# Patient Record
Sex: FEMALE | Race: WHITE | NOT HISPANIC OR LATINO | Employment: FULL TIME | URBAN - METROPOLITAN AREA
[De-identification: names, ages, dates, MRNs, and addresses within clinical notes are randomized per-mention and may not be internally consistent; named-entity substitution may affect disease eponyms.]

---

## 2018-03-14 PROBLEM — M54.9 ACUTE MIDLINE BACK PAIN: Status: ACTIVE | Noted: 2018-03-14

## 2018-08-21 ENCOUNTER — OFFICE VISIT (OUTPATIENT)
Dept: NEUROLOGY | Facility: CLINIC | Age: 33
End: 2018-08-21
Payer: COMMERCIAL

## 2018-08-21 VITALS
SYSTOLIC BLOOD PRESSURE: 118 MMHG | HEIGHT: 63 IN | DIASTOLIC BLOOD PRESSURE: 89 MMHG | BODY MASS INDEX: 32.38 KG/M2 | WEIGHT: 182.75 LBS | HEART RATE: 109 BPM

## 2018-08-21 DIAGNOSIS — R51.9 CHRONIC INTRACTABLE HEADACHE, UNSPECIFIED HEADACHE TYPE: ICD-10-CM

## 2018-08-21 DIAGNOSIS — G89.29 CHRONIC INTRACTABLE HEADACHE, UNSPECIFIED HEADACHE TYPE: ICD-10-CM

## 2018-08-21 DIAGNOSIS — G44.40 MEDICATION OVERUSE HEADACHE: ICD-10-CM

## 2018-08-21 DIAGNOSIS — G43.019 INTRACTABLE MIGRAINE WITHOUT AURA AND WITHOUT STATUS MIGRAINOSUS: ICD-10-CM

## 2018-08-21 PROCEDURE — 99204 OFFICE O/P NEW MOD 45 MIN: CPT | Mod: S$GLB,,, | Performed by: NURSE PRACTITIONER

## 2018-08-21 PROCEDURE — 99999 PR PBB SHADOW E&M-NEW PATIENT-LVL III: CPT | Mod: PBBFAC,,, | Performed by: NURSE PRACTITIONER

## 2018-08-21 PROCEDURE — 3008F BODY MASS INDEX DOCD: CPT | Mod: CPTII,S$GLB,, | Performed by: NURSE PRACTITIONER

## 2018-08-21 RX ORDER — SUMATRIPTAN SUCCINATE 100 MG/1
TABLET ORAL
COMMUNITY
Start: 2018-08-08 | End: 2018-08-21 | Stop reason: ALTCHOICE

## 2018-08-21 RX ORDER — ACETAMINOPHEN, CAFFEINE, DIHYDROCODEINE BITARTRATE 320.5; 30; 16 MG/1; MG/1; MG/1
1 CAPSULE ORAL 3 TIMES DAILY
COMMUNITY
End: 2018-09-18

## 2018-08-21 RX ORDER — TOPIRAMATE 200 MG/1
200 CAPSULE, EXTENDED RELEASE ORAL DAILY
COMMUNITY
End: 2018-09-18 | Stop reason: SDUPTHER

## 2018-08-21 RX ORDER — PREDNISONE 20 MG/1
TABLET ORAL
Qty: 30 TABLET | Refills: 0 | Status: SHIPPED | OUTPATIENT
Start: 2018-08-21 | End: 2018-09-18 | Stop reason: ALTCHOICE

## 2018-08-21 RX ORDER — ELETRIPTAN HYDROBROMIDE 40 MG/1
40 TABLET, FILM COATED ORAL
Qty: 12 TABLET | Refills: 5 | Status: SHIPPED | OUTPATIENT
Start: 2018-08-21 | End: 2020-12-03

## 2018-08-21 NOTE — PROGRESS NOTES
SUBJECTIVE:  Patient ID: La Nena Wright   MRN: [id number]  Referred By: Aaarefchio Self  Chief Complaint: Consult    History of Present Illness:   32 y.o. female with chronic migraines, anxiety, IBS, obesity, and fibromyalgia, who presents to clinic alone for evaluation of headaches.  Headaches and migraines initially began when she was 11 years old and have persisted since.  Mother has migraines as well.  States she has had a continuous migraine for the last week.  States she is seeing a neurologist at home, had an occipital nerve block done on August 3rd, was scheduled for facet blocks yesterday, however approval did not go through and injections were subsequently cancelled.  She is here for a second opinion?  Migraines typically are one-sided or the other, pain is described as a pounding or throbbing that is severe in nature.  Migraines occur at least 15 days per month each lasting all day if not multiple days.  Associated symptoms include nausea, vomiting, photo/phonophobia.  Migraines are worsened by movement. She has tried numerous preventive options without effect on her migraines.  Since she had the occipital nerve block, she feels the nerve block triggered this migraine she now has muscle atrophy in her right arm as well as burning in her arms.  Reports she has had MRI C-Spine as well as two EMG's performed by outside radiology.  Additionally, she is getting  in November, currently not on birth control, though she and her fiance are using condoms and not actively planning to get pregnant.      Treatments Tried and Response  Amitriptyline - caused AM drowsiness   Excedrin -   Trokendi - no   Nerve blocks - no   Relpax - helped in past  Sumatriptan PO - no   Sumatriptan SQ - no   Onzetra - no   Maxalt - no   Gabapentin - no   cymbalta - unsure   Prednisone - given today   Topiramate - no   Propranolol - no   Zanaflex - given today     Current Medications:    acetaminophen-caff-dihydrocod (TREZIX)  "320.5-30-16 mg Cap, Take 1 capsule by mouth 3 (three) times daily., Disp: , Rfl:     aspirin-acetaminophen-caffeine 250-250-65 mg (EXCEDRIN MIGRAINE) 250-250-65 mg per tablet, Take 1 tablet by mouth every 6 (six) hours as needed for Pain., Disp: , Rfl:     fenoprofen 400 mg Cap, Take 400 mg by mouth as needed., Disp: , Rfl:     hydrocodone-acetaminophen 7.5-325mg (NORCO) 7.5-325 mg per tablet, Take 1 tablet by mouth continuous prn. , Disp: , Rfl:     topiramate (TROKENDI XR) 200 mg Cp24, Take 400 mg by mouth., Disp: , Rfl:     eletriptan (RELPAX) 40 MG tablet, Take 1 tablet (40 mg total) by mouth as needed. may repeat in 2 hours if necessary.  Max 2 tabs/day. Max 3 days/week., Disp: 12 tablet, Rfl: 5    gabapentin enacarbil (HORIZANT) 600 mg TbSR, Take 600 mg by mouth every evening., Disp: , Rfl:     multivitamin (ONE DAILY MULTIVITAMIN) per tablet, Take 1 tablet by mouth once daily., Disp: , Rfl:     norethindrone-ethinyl estradiol-iron (MICROGESTIN FE1.5/30) 1.5 mg-30 mcg (21)/75 mg (7) tablet, Take 1 tablet by mouth once daily., Disp: , Rfl:     predniSONE (DELTASONE) 20 MG tablet, 3 tabs in AM x 5 days, then 2 tabs in AM x 5 days, then 1 tab in AM x 5 days. Take with food., Disp: 30 tablet, Rfl: 0    tiZANidine (ZANAFLEX) 2 MG tablet, Take 4 mg by mouth every evening., Disp: , Rfl:     vitamin D 1000 units Tab, Take 1,000 Units by mouth once daily., Disp: , Rfl:     Review of Systems - as per HPI, otherwise a balanced 10 systems review is negative.    OBJECTIVE:  Vitals:  /89 (BP Location: Left arm, Patient Position: Sitting, BP Method: X-Large (Automatic))   Pulse 109   Ht 5' 3" (1.6 m)   Wt 82.9 kg (182 lb 12.2 oz)   BMI 32.37 kg/m²     Physical Exam   Constitutional: She appears well-developed and well-nourished. She is well groomed. NAD  HENT:    Head: Normocephalic and atraumatic, oral and nasal mucosa intact.  Frontalis was NTTP, temporalis was NTTP    Eyes: Conjunctivae and EOM are " normal. Pupils are equal, round, and reactive to light   Neck: Neck supple. Occiput and trapezius NTTP    Musculoskeletal: Normal range of motion. No joint stiffness. No vertebral point tenderness.  Skin: Skin is warm and dry.  Psychiatric: Normal mood and affect.     Neuro exam:    Mental status:  The patient is alert and oriented to person, place and time.  Language is intact and fluent  Remote and recent memory are intact  Normal attention and concentration  Mood is stable    Cranial Nerves:  Fundoscopic examination does not reveal any occult papilledema.    Pupils are equal and reactive to light.    Extraocular movements are intact and without nystagmus.    Visual fields are full to confrontation testing.   Facial movement is symmetric.  Facial sensation is intact.    Hearing is intact to finger rub   Uvula in midline. Tongue in midline without fasciculation.   FROM of neck in all (6) directions.  Shoulder shrug symmetrical.    Coordination:     Finger to nose - normal and symmetric bilaterally   Heel to shin test - normal and symmetric bilaterally     Motor:  Normal muscle bulk and symmetry. No fasciculations were noted.   Tremor not apparent   Pronator drift not apparent.    strength was strong and symmetric   Finger extension strength was strong and symmetric   RUE:appropriate against gravity and medium force as tested 5/5  LUE: appropriate against gravity and medium force as tested 5/5  RLE:appropriate against gravity and medium force as tested 5/5              LLE: appropriate against gravity and medium force as tested 5/5    Reflexes:  Right Brachioradialis 2+  Left Brachioradialis 2+  Right Biceps 2+  Left Biceps 2+  Right Patellar2+  Left Patellar 2+                          Gutierrez was positive on right, negative on left     Sensory:  RUE  deficit noted - subjective report of decreased sensation to light touch   LUE intact light touch    RLE intact light touch  LLE intact light touch    Normal  "Gait    ASSESSMENT:  1. Chronic migraine    2. Intractable migraine without aura and without status migrainosus    3. Chronic intractable headache, unspecified headache type    4. Medication overuse headache      Medical Decision Making:  BOTOX  The patient has chronic migraines (G43.719) and suffers from headaches more than 15 days a month lasting more than 4 hours a day with no relief of symptoms despite trying multiple medications including amitriptyline, zanaflex, propranolol, prednisone, topiramate, nerve blocks, relpax, sumatriptan SQ, sumatriptan PO, cymbalta, gabapentin, maxalt, onzetra, relpax, excedrin. Botox treatment was approved for chronic migraines in October 2010. The patient will be an ideal candidate for Botox. We are planning for 3 treatments 3 months apart and aiming for at least 50% improvement in the symptoms. If we see no improvement after 3 treatments, we will discontinue the injections.    PLAN:  - Seek approval for Botox injections for chronic migraine   - Record request from Dr. Anthony   - Given 15 day prednisone taper - 60 mg x 5 days, 40 mg x 5 days, 20 mg x 5 days, take in the morning with food   - Continue gabapentin and trokendi for migraine prevention   - Restart Cymbalta to help with both anxiety and migraine prevention   - For migraine abortive - given Relpax 40 mg tabs   - Consider starting Aimovig in future    - Lengthy discussion regarding appropriate use of "as needed" medications to avoid medication overuse headache   - Start tracking headaches via Migraine Buddy claudy on phone   - RTC in 1 month to start Botox injections      Orders Placed This Encounter    predniSONE (DELTASONE) 20 MG tablet    eletriptan (RELPAX) 40 MG tablet     I have discussed realistic goals of care with patient at length as well as medication options, and need for lifestyle adjustment. I have explained that treatment will take time. We have agreed that the goal will be to reduce " frequency/intensity/quantity of HA, not to be completely HA free. I have explained my non narcotic policy regarding headache treatment.    Patient to track frequency of headaches.  Patient agreeable to work on lifestyle adjustments.    Discussed potential for teratogenicity with treatment, patient understands if her family planning status should change she will contact office immediately and we will safely adjust medications as needed.  Current using Condoms.     I spent 50 minutes face-to-face with the patient with >50% of the time spent with counseling and education regarding:  - results of data, diagnosis, and recommendations stated above  - risks, benefits, and potential side effects of Botox, prednisone discussed   - alternative treatment options including aimovig offered   - importance of healthy diet, regular exercise and sleep hygiene in the treatment of headaches      Questions and concerns were sought and answered to the patient's stated verbal satisfaction.  The patient verbalizes understanding and agreement with the above stated treatment plan.     CC: MD Sari Kang, FNP-C  [facility]  [phone]    Dr. Cotto was available during today's encounter.

## 2018-08-22 ENCOUNTER — TELEPHONE (OUTPATIENT)
Dept: NEUROLOGY | Facility: CLINIC | Age: 33
End: 2018-08-22

## 2018-08-23 ENCOUNTER — TELEPHONE (OUTPATIENT)
Dept: NEUROLOGY | Facility: CLINIC | Age: 33
End: 2018-08-23

## 2018-08-23 ENCOUNTER — PATIENT MESSAGE (OUTPATIENT)
Dept: NEUROLOGY | Facility: CLINIC | Age: 33
End: 2018-08-23

## 2018-08-23 NOTE — TELEPHONE ENCOUNTER
Pa questions for eletriptan were completed. Tablet quantity changed to 9, and signed by RODERICK Arauz, faxed to [facility] [place]

## 2018-08-23 NOTE — TELEPHONE ENCOUNTER
I left message w/my direct number for pt to call back. Need to complete questions for pA for eletriptan authorization

## 2018-08-24 ENCOUNTER — TELEPHONE (OUTPATIENT)
Dept: NEUROLOGY | Facility: CLINIC | Age: 33
End: 2018-08-24

## 2018-08-30 ENCOUNTER — TELEPHONE (OUTPATIENT)
Dept: NEUROLOGY | Facility: CLINIC | Age: 33
End: 2018-08-30

## 2018-08-30 NOTE — TELEPHONE ENCOUNTER
Records placed on your desk, I'm not sure if you saw them or not. I was getting ready to send them to be scanned.

## 2018-08-30 NOTE — TELEPHONE ENCOUNTER
----- Message from Temo Lowry sent at 8/30/2018  3:49 PM CDT -----  Needs Advice    Reason for call: Pt is calling to confirm if Sari was able to review records for muscle atrophy in right forearm, so she can be referred to a neurologist     Communication Preference: [facility]  Additional Information:

## 2018-09-04 ENCOUNTER — PATIENT MESSAGE (OUTPATIENT)
Dept: NEUROLOGY | Facility: CLINIC | Age: 33
End: 2018-09-04

## 2018-09-04 DIAGNOSIS — M50.10 CERVICAL DISC DISORDER WITH RADICULOPATHY OF CERVICAL REGION: ICD-10-CM

## 2018-09-04 DIAGNOSIS — G89.29 CHRONIC NECK PAIN: Primary | ICD-10-CM

## 2018-09-04 DIAGNOSIS — M54.2 CHRONIC NECK PAIN: Primary | ICD-10-CM

## 2018-09-18 ENCOUNTER — OFFICE VISIT (OUTPATIENT)
Dept: NEUROLOGY | Facility: CLINIC | Age: 33
End: 2018-09-18
Payer: COMMERCIAL

## 2018-09-18 ENCOUNTER — TELEPHONE (OUTPATIENT)
Dept: PHARMACY | Facility: CLINIC | Age: 33
End: 2018-09-18

## 2018-09-18 VITALS
WEIGHT: 180.75 LBS | DIASTOLIC BLOOD PRESSURE: 94 MMHG | HEART RATE: 95 BPM | BODY MASS INDEX: 32.03 KG/M2 | HEIGHT: 63 IN | SYSTOLIC BLOOD PRESSURE: 128 MMHG

## 2018-09-18 DIAGNOSIS — G43.009 MIGRAINE WITHOUT AURA AND WITHOUT STATUS MIGRAINOSUS, NOT INTRACTABLE: ICD-10-CM

## 2018-09-18 DIAGNOSIS — M79.18 MYOFASCIAL PAIN: ICD-10-CM

## 2018-09-18 DIAGNOSIS — G47.9 TROUBLE IN SLEEPING: ICD-10-CM

## 2018-09-18 PROCEDURE — 99214 OFFICE O/P EST MOD 30 MIN: CPT | Mod: S$GLB,,, | Performed by: NURSE PRACTITIONER

## 2018-09-18 PROCEDURE — 3008F BODY MASS INDEX DOCD: CPT | Mod: CPTII,S$GLB,, | Performed by: NURSE PRACTITIONER

## 2018-09-18 PROCEDURE — 99999 PR PBB SHADOW E&M-EST. PATIENT-LVL III: CPT | Mod: PBBFAC,,, | Performed by: NURSE PRACTITIONER

## 2018-09-18 RX ORDER — HYDROCODONE BITARTRATE AND ACETAMINOPHEN 5; 325 MG/1; MG/1
1 TABLET ORAL EVERY 6 HOURS PRN
Status: ON HOLD | COMMUNITY
End: 2020-07-22 | Stop reason: HOSPADM

## 2018-09-18 RX ORDER — TOPIRAMATE 200 MG/1
200 CAPSULE, EXTENDED RELEASE ORAL DAILY
Qty: 30 CAPSULE | Refills: 11 | Status: SHIPPED | OUTPATIENT
Start: 2018-09-18 | End: 2018-12-11

## 2018-09-18 RX ORDER — TIZANIDINE 2 MG/1
TABLET ORAL
Qty: 60 TABLET | Refills: 5 | Status: SHIPPED | OUTPATIENT
Start: 2018-09-18 | End: 2020-12-03

## 2018-09-18 RX ORDER — LACOSAMIDE 150 MG/1
75 TABLET ORAL NIGHTLY PRN
COMMUNITY
End: 2018-12-11

## 2018-09-18 NOTE — PROGRESS NOTES
"Established Patient   SUBJECTIVE:  Patient ID: La Nena Wright   Chief Complaint: Follow-up    History of Present Illness:  La Nena Wright is a 33 y.o. female who presents to clinic with her mother for follow-up of headaches.  History is provided by both the patient and her mother.     Recommendations made at last Office Visit on 8/201/2018:  - Seek approval for Botox injections for chronic migraine   - Record request from Dr. Anthony   - Given 15 day prednisone taper - 60 mg x 5 days, 40 mg x 5 days, 20 mg x 5 days, take in the morning with food   - Continue gabapentin and trokendi for migraine prevention   - Restart Cymbalta to help with both anxiety and migraine prevention   - For migraine abortive - given Relpax 40 mg tabs   - Consider starting Aimovig in future    - Lengthy discussion regarding appropriate use of "as needed" medications to avoid medication overuse headache   - Start tracking headaches via Migraine Buddy claudy on phone   - RTC in 1 month to start Botox injections      09/18/2018 - Interval History:  Headaches have continued to occur daily with full blown migraines occurring at least 2-3 days per week.  Since last visit, she has stopped taking cymbalta and gabapentin.  She has continued taking trokendi nightly with vimpat prn nightly.  She considered getting Botox injections for chronic migraine, however has determined she does not want to go through the the Botox injections and would like to discuss alternative treatment options.  She denies any change in the quality or nature of her headaches.      History of Present Illness:   32 y.o. female with chronic migraines, anxiety, IBS, obesity, and fibromyalgia, who presents to clinic alone for evaluation of headaches.  Headaches and migraines initially began when she was 11 years old and have persisted since.  Mother has migraines as well.  States she has had a continuous migraine for the last week.  States she is seeing a neurologist at home, " had an occipital nerve block done on August 3rd, was scheduled for facet blocks yesterday, however approval did not go through and injections were subsequently cancelled.  She is here for a second opinion?  Migraines typically are one-sided or the other, pain is described as a pounding or throbbing that is severe in nature.  Migraines occur at least 15 days per month each lasting all day if not multiple days.  Associated symptoms include nausea, vomiting, photo/phonophobia.  Migraines are worsened by movement. She has tried numerous preventive options without effect on her migraines.  Since she had the occipital nerve block, she feels the nerve block triggered this migraine she now has muscle atrophy in her right arm as well as burning in her arms.  Reports she has had MRI C-Spine as well as two EMG's performed by outside radiology.  Additionally, she is getting  in November, currently not on birth control, though she and her fiance are using condoms and not actively planning to get pregnant.       Treatments Tried and Response  Amitriptyline - caused AM drowsiness   Excedrin -   Trokendi - no   Nerve blocks - no   Relpax - helped in past  Sumatriptan PO - no   Sumatriptan SQ - no   Onzetra - no   Maxalt - no   Gabapentin - no   cymbalta - unsure   Prednisone - given today   Topiramate - no   Propranolol - no   Zanaflex - retried today   Aimovig - given today     Current Medications:    aspirin-acetaminophen-caffeine 250-250-65 mg (EXCEDRIN MIGRAINE) 250-250-65 mg per tablet, Take 1 tablet by mouth every 6 (six) hours as needed for Pain., Disp: , Rfl:     eletriptan (RELPAX) 40 MG tablet, Take 1 tablet (40 mg total) by mouth as needed. may repeat in 2 hours if necessary.  Max 2 tabs/day. Max 3 days/week., Disp: 12 tablet, Rfl: 5    HYDROcodone-acetaminophen (NORCO) 5-325 mg per tablet, Take 1 tablet by mouth every 6 (six) hours as needed for Pain (1/2 tablet as needed)., Disp: , Rfl:     multivitamin (ONE  "DAILY MULTIVITAMIN) per tablet, Take 1 tablet by mouth once daily., Disp: , Rfl:     norethindrone-ethinyl estradiol-iron (MICROGESTIN FE1.5/30) 1.5 mg-30 mcg (21)/75 mg (7) tablet, Take 1 tablet by mouth once daily., Disp: , Rfl:     tiZANidine (ZANAFLEX) 2 MG tablet, Take 1-2 tabs around bedtime, no alcohol, no driving with medication., Disp: 60 tablet, Rfl: 5    topiramate (TROKENDI XR) 200 mg Cp24, Take 200 mg by mouth once daily., Disp: 30 capsule, Rfl: 11    vitamin D 1000 units Tab, Take 1,000 Units by mouth once daily., Disp: , Rfl:     erenumab-aooe (AIMOVIG AUTOINJECTOR) 70 mg/mL AtIn, Inject 2 mLs (140 mg total) into the skin every 28 days., Disp: 2 Syringe, Rfl: 11    lacosamide (VIMPAT) 150 mg Tab, Take 75 mg by mouth nightly as needed., Disp: , Rfl:     Review of Systems - as per HPI, otherwise a balanced 10 systems review is negative.    OBJECTIVE:  Vitals:  BP (!) 128/94 (BP Location: Right arm, Patient Position: Sitting, BP Method: Large (Automatic))   Pulse 95   Ht 5' 3" (1.6 m)   Wt 82 kg (180 lb 12.4 oz)   BMI 32.02 kg/m²      Physical Exam:  Constitutional: She appears well-developed and well-nourished. She is well groomed. NAD. Obese.    HENT:    Head: Normocephalic and atraumatic   Musculoskeletal: Normal range of motion. No joint stiffness.   Skin: Skin is warm and dry.  Psychiatric: Normal mood and affect.  Neuro: Patient is alert and oriented to person, place and time.  Speech is clear and fluent. Recent and remote memory intact.  Moves all 4 extremities against gravity. Gait and station normal.      Review of Data:   Patient brought MR C-Spine performed 9/7/2018 to clinic, imaging was reviewed with patient    Note: I have independently reviewed any/all imaging/labs/tests and agree with the report (s) as documented.  Any discrepancies will be as noted/demarcated by free text.  PATY IZAGUIRRE 9/18/2018    ASSESSMENT:  1. Chronic migraine    2. Migraine without aura and without status " migrainosus, not intractable    3. Myofascial pain    4. Trouble in sleeping      PLAN:  - Start Aimovig 140 mg SQ monthly injectable   - Continue Trokendi 200 mg nightly   - Restart Zanaflex 2-4 mg nightly   - Has relpax available to take as needed for migraine abortive   - Okay to continue taking Vimpat 75 mg as needed to help with sleep   - Continue tracking headaches   - Discussed goals of therapy are to decrease the frequency, intensity, and duration of headaches  - recommend she avoid use of Norco for acute pain, if absolutely needed, she is to avoid use of norco on more than 2-3 days per week to avoid rebound or MOH   - Follow up in 3 months or sooner if needed     Orders Placed This Encounter    tiZANidine (ZANAFLEX) 2 MG tablet    erenumab-aooe (AIMOVIG AUTOINJECTOR) 70 mg/mL AtIn    topiramate (TROKENDI XR) 200 mg Cp24     Discussed potential for teratogenicity with treatment, patient understands if her family planning status should change she will contact office immediately and we will safely adjust medications as needed.  Currently on birth control, uses back-up method as well.      I spent 25 minutes face-to-face with the patient with >50% of the time spent with counseling and education regarding:  - results of data, diagnosis, and recommendations stated above  - risks, benefits, and potential side effects of aimovig discussed   - alternative treatment options including gabapentin, effexor offered   - importance of healthy diet, regular exercise and sleep hygiene in the treatment of headaches     Questions and concerns were sought and answered to the patient's stated verbal satisfaction.  The patient verbalizes understanding and agreement with the above stated treatment plan.     CC: MD Sari Kang, FNP-C  [facility]   [phone]    Dr. Cotto was available during today's encounter.

## 2018-09-21 NOTE — TELEPHONE ENCOUNTER
DOCUMENTATION ONLY:  Prior Authorization for Aimovig approved from 09/21/18 to 12/21/18    Case Id: [id number]    Co-pay: 0    Patient Assistance IS NOT required. Forwarded to the clinical pharmacist for consult and shipment.  KITTY

## 2018-09-26 ENCOUNTER — TELEPHONE (OUTPATIENT)
Dept: PHARMACY | Facility: CLINIC | Age: 33
End: 2018-09-26

## 2018-09-26 NOTE — TELEPHONE ENCOUNTER
Initial Aimovig consult completed on 18. Aimovig 140mg will be shipped on 18 to arrive at patient's home on 18 via [employer]. $0.00 copay. Patient intends to start Aimovig once received (18). Address confirmed. Confirmed 2 patient identifiers - name and . Therapy Appropriate.    --Injection experience: yes    Counseled patient on administration directions:  - Inject 140 mg (2 auto-injectors) into the skin every 30 days.   - Informed to store in refrigerator prior to use (do not freeze, do not shake, keep in original box until use).  - Take out of the refrigerator 30-60 minutes prior to injection.  - Wash hands before and after injection.  - Monthly RX will come with gauze, bandaids, and alcohol swabs.  - Patient may inject in either the tops of the thighs, abdomen- but at least 2 inches away from belly button, or the outer part of her upper arm (with assistance). Informed patient to use 2 different injection sites since she will be doing 2 injections for full 140 mg dose.   - Patient is to wipe down the injection site with the alcohol pad, wait to dry.    - Patient should rotate injection sites.   - Patient will use sharps container; once full, per [place] law, she/ he may lock the sharps container and place in trash. Pharmacy will replace the sharps at no additional charge.    Patient declined full review of administration technique. Stated that she saw demonstration in office and is comfortable. She will also watch the online training video.     Patient was counseled on possible side effects:  - Injection site reaction: redness, soreness, itching, bruising, which should resolve within 3-5 days.  - constipation, possible dizziness.    Advised to keep a calendar to stay compliant.     Consultation included: indication; goals of treatment; administration; storage and handling; side effects; how to handle side effects; the importance of compliance; the importance of keeping all follow  up appointments.  Patient understands to report any medication changes to [facility] and provider. All questions answered and addressed to patients satisfaction. I will f/u with patient in 7-10 days from start, [facility] to contact patient in 3 weeks for refills.       Moise Elizondo, PharmD  Clinical Pharmacist  [facility]  P: [phone]      In[place]akyree sent on 9/26/18 at 10:04 am

## 2018-10-01 ENCOUNTER — TELEPHONE (OUTPATIENT)
Dept: NEUROLOGY | Facility: CLINIC | Age: 33
End: 2018-10-01

## 2018-10-01 ENCOUNTER — PATIENT MESSAGE (OUTPATIENT)
Dept: NEUROLOGY | Facility: CLINIC | Age: 33
End: 2018-10-01

## 2018-10-01 RX ORDER — TOPIRAMATE 100 MG/1
100 TABLET, FILM COATED ORAL 2 TIMES DAILY
Qty: 60 TABLET | Refills: 2 | Status: SHIPPED | OUTPATIENT
Start: 2018-10-01 | End: 2018-12-11

## 2018-10-02 ENCOUNTER — PATIENT MESSAGE (OUTPATIENT)
Dept: NEUROLOGY | Facility: CLINIC | Age: 33
End: 2018-10-02

## 2018-10-02 ENCOUNTER — TELEPHONE (OUTPATIENT)
Dept: NEUROLOGY | Facility: CLINIC | Age: 33
End: 2018-10-02

## 2018-10-19 ENCOUNTER — TELEPHONE (OUTPATIENT)
Dept: PHARMACY | Facility: CLINIC | Age: 33
End: 2018-10-19

## 2018-11-02 ENCOUNTER — OFFICE VISIT (OUTPATIENT)
Dept: URGENT CARE | Facility: CLINIC | Age: 33
End: 2018-11-02
Payer: COMMERCIAL

## 2018-11-02 VITALS
HEART RATE: 77 BPM | SYSTOLIC BLOOD PRESSURE: 136 MMHG | TEMPERATURE: 100 F | HEIGHT: 63 IN | BODY MASS INDEX: 31.89 KG/M2 | DIASTOLIC BLOOD PRESSURE: 91 MMHG | WEIGHT: 180 LBS | RESPIRATION RATE: 18 BRPM | OXYGEN SATURATION: 98 %

## 2018-11-02 DIAGNOSIS — J01.40 ACUTE NON-RECURRENT PANSINUSITIS: ICD-10-CM

## 2018-11-02 DIAGNOSIS — R68.89 FLU-LIKE SYMPTOMS: Primary | ICD-10-CM

## 2018-11-02 DIAGNOSIS — J02.9 ACUTE PHARYNGITIS, UNSPECIFIED ETIOLOGY: ICD-10-CM

## 2018-11-02 LAB
CTP QC/QA: YES
FLUAV AG NPH QL: NEGATIVE
FLUBV AG NPH QL: NEGATIVE

## 2018-11-02 PROCEDURE — 99214 OFFICE O/P EST MOD 30 MIN: CPT | Mod: 25,S$GLB,, | Performed by: FAMILY MEDICINE

## 2018-11-02 PROCEDURE — 87804 INFLUENZA ASSAY W/OPTIC: CPT | Mod: QW,S$GLB,, | Performed by: FAMILY MEDICINE

## 2018-11-02 PROCEDURE — 96372 THER/PROPH/DIAG INJ SC/IM: CPT | Mod: S$GLB,,, | Performed by: FAMILY MEDICINE

## 2018-11-02 PROCEDURE — 3008F BODY MASS INDEX DOCD: CPT | Mod: CPTII,S$GLB,, | Performed by: FAMILY MEDICINE

## 2018-11-02 RX ORDER — PROMETHAZINE HYDROCHLORIDE 25 MG/1
25 TABLET ORAL EVERY 6 HOURS PRN
Qty: 20 TABLET | Refills: 0 | Status: SHIPPED | OUTPATIENT
Start: 2018-11-02 | End: 2019-02-18

## 2018-11-02 RX ORDER — DEXAMETHASONE SODIUM PHOSPHATE 100 MG/10ML
5 INJECTION INTRAMUSCULAR; INTRAVENOUS
Status: COMPLETED | OUTPATIENT
Start: 2018-11-02 | End: 2018-11-02

## 2018-11-02 RX ORDER — CEFDINIR 300 MG/1
300 CAPSULE ORAL 2 TIMES DAILY
Qty: 20 CAPSULE | Refills: 0 | Status: SHIPPED | OUTPATIENT
Start: 2018-11-02 | End: 2018-11-12

## 2018-11-02 RX ADMIN — DEXAMETHASONE SODIUM PHOSPHATE 5 MG: 100 INJECTION INTRAMUSCULAR; INTRAVENOUS at 05:11

## 2018-11-02 NOTE — PROGRESS NOTES
"Subjective:       Patient ID: La Nena Wright is a 33 y.o. female.    Vitals:  height is 5' 3" (1.6 m) and weight is 81.6 kg (180 lb). Her temperature is 99.6 °F (37.6 °C). Her blood pressure is 136/91 (abnormal) and her pulse is 77. Her respiration is 18 and oxygen saturation is 98%.     Chief Complaint: Sinus Problem    Sinus Problem   This is a new problem. The current episode started in the past 7 days. The problem is unchanged. There has been no fever. The pain is moderate. Associated symptoms include chills, congestion, coughing and a sore throat. Pertinent negatives include no ear pain, headaches, hoarse voice or shortness of breath. Past treatments include oral decongestants and acetaminophen. The treatment provided no relief.     Review of Systems   Constitution: Positive for chills and malaise/fatigue. Negative for fever.   HENT: Positive for congestion and sore throat. Negative for ear pain and hoarse voice.    Eyes: Negative for discharge and redness.   Cardiovascular: Negative for chest pain, dyspnea on exertion and leg swelling.   Respiratory: Positive for cough. Negative for shortness of breath, sputum production and wheezing.    Musculoskeletal: Positive for myalgias.   Gastrointestinal: Positive for constipation and nausea. Negative for abdominal pain.   Neurological: Negative for headaches.       Objective:      Physical Exam   Constitutional: She is oriented to person, place, and time. She appears well-developed and well-nourished. She is cooperative.  Non-toxic appearance. She does not appear ill. No distress.   HENT:   Head: Normocephalic and atraumatic.   Right Ear: Hearing, tympanic membrane, external ear and ear canal normal.   Left Ear: Hearing, tympanic membrane, external ear and ear canal normal.   Nose: No mucosal edema, rhinorrhea or nasal deformity. No epistaxis. Right sinus exhibits maxillary sinus tenderness and frontal sinus tenderness. Left sinus exhibits maxillary sinus " tenderness and frontal sinus tenderness.   Mouth/Throat: Uvula is midline and mucous membranes are normal. No trismus in the jaw. Normal dentition. No uvula swelling. Posterior oropharyngeal edema and posterior oropharyngeal erythema present.   Eyes: Conjunctivae and lids are normal. No scleral icterus.   Neck: Trachea normal, full passive range of motion without pain and phonation normal. Neck supple.   Cardiovascular: Normal rate, regular rhythm, normal heart sounds, intact distal pulses and normal pulses.   Pulmonary/Chest: Effort normal and breath sounds normal. No respiratory distress.   Abdominal: Soft. Normal appearance and bowel sounds are normal. She exhibits no distension. There is no tenderness.   Musculoskeletal: Normal range of motion. She exhibits no edema or deformity.   Neurological: She is alert and oriented to person, place, and time. She exhibits normal muscle tone. Coordination normal.   Skin: Skin is warm, dry and intact. She is not diaphoretic. No pallor.   Psychiatric: She has a normal mood and affect. Her speech is normal and behavior is normal. Judgment and thought content normal. Cognition and memory are normal.   Nursing note and vitals reviewed.      Results for orders placed or performed in visit on 11/02/18   POCT Influenza A/B   Result Value Ref Range    Rapid Influenza A Ag Negative Negative    Rapid Influenza B Ag Negative Negative     Acceptable Yes        Assessment:       1. Flu-like symptoms    2. Acute pharyngitis, unspecified etiology    3. Acute non-recurrent pansinusitis        Plan:         Flu-like symptoms  -     POCT Influenza A/B    Acute pharyngitis, unspecified etiology    Acute non-recurrent pansinusitis  -     cefdinir (OMNICEF) 300 MG capsule; Take 1 capsule (300 mg total) by mouth 2 (two) times daily. for 10 days  Dispense: 20 capsule; Refill: 0  -     dexchlorpheniramin-pseudoephed (RESCON) 2-60 mg Tab; Take 1 tablet by mouth 2 (two) times daily as  needed.  Dispense: 20 tablet; Refill: 0  -     dexamethasone injection 5 mg    Please drink plenty of fluids.  Please get plenty of rest.  Please return here or go to the Emergency Department for any concerns or worsening of condition.  If you were given wait & see antibiotics, please wait 3-5 days before taking them, and only take them if your symptoms have worsened or not improved.  If you do begin taking the antibiotics, please take them to completion.  If you were prescribed antibiotics, please take them to completion.  If you were prescribed a narcotic medication, do not drive or operate heavy equipment or machinery while taking these medications.    You were given a decongestant (RESCON or POLY VENT Dm).  If your insurance does not cover it or you cannot afford it, it is ok to use the over the counter products listed below.  If you do not have Hypertension or any history of palpitations, it is ok to take over the counter Sudafed or Mucinex D or Allegra-D or Claritin-D or Zyrtec-D.  If you do take one of the above, it is ok to combine that with plain over the counter Mucinex or Allegra or Claritin or Zyrtec.  If for example you are taking Zyrtec -D, you can combine that with Mucinex, but not Mucinex-D.  If you are taking Mucinex-D, you can combine that with plain Allegra or Claritin or Zyrtec.   If you do have Hypertension or palpitations, it is safe to take Coricidin HBP for relief of sinus symptoms.    We recommend you take over the counter Flonase (Fluticasone) or another nasally inhaled steroid unless you are already taking one.  Nasal irrigation with a saline spray or Netti Pot like device per their directions is also recommended.  If not allergic, please take over the counter Tylenol (Acetaminophen) and/or Motrin (Ibuprofen) as directed for control of pain and/or fever.    Robitussin DM 2 teas every 4 hours as needed for cough.  If you  smoke, please stop smoking.    You were given an injection of  steroid.  This will relieve swelling and inflammation and improve respiratory symptoms.  It can raise your blood sugar if you are diabetic.    Please follow up with your primary care doctor or specialist as needed.  Branden Thao MD  [phone]

## 2018-11-02 NOTE — PATIENT INSTRUCTIONS
Please drink plenty of fluids.  Please get plenty of rest.  Please return here or go to the Emergency Department for any concerns or worsening of condition.  If you were given wait & see antibiotics, please wait 3-5 days before taking them, and only take them if your symptoms have worsened or not improved.  If you do begin taking the antibiotics, please take them to completion.  If you were prescribed antibiotics, please take them to completion.  If you were prescribed a narcotic medication, do not drive or operate heavy equipment or machinery while taking these medications.    You were given a decongestant (RESCON or POLY VENT Dm).  If your insurance does not cover it or you cannot afford it, it is ok to use the over the counter products listed below.  If you do not have Hypertension or any history of palpitations, it is ok to take over the counter Sudafed or Mucinex D or Allegra-D or Claritin-D or Zyrtec-D.  If you do take one of the above, it is ok to combine that with plain over the counter Mucinex or Allegra or Claritin or Zyrtec.  If for example you are taking Zyrtec -D, you can combine that with Mucinex, but not Mucinex-D.  If you are taking Mucinex-D, you can combine that with plain Allegra or Claritin or Zyrtec.   If you do have Hypertension or palpitations, it is safe to take Coricidin HBP for relief of sinus symptoms.    We recommend you take over the counter Flonase (Fluticasone) or another nasally inhaled steroid unless you are already taking one.  Nasal irrigation with a saline spray or Netti Pot like device per their directions is also recommended.  If not allergic, please take over the counter Tylenol (Acetaminophen) and/or Motrin (Ibuprofen) as directed for control of pain and/or fever.    Robitussin DM 2 teas every 4 hours as needed for cough.  If you  smoke, please stop smoking.    You were given an injection of steroid.  This will relieve swelling and inflammation and improve respiratory  symptoms.  It can raise your blood sugar if you are diabetic.    Please follow up with your primary care doctor or specialist as needed.  Branden Thao MD  [phone]        Acute Bacterial Rhinosinusitis (ABRS)  Acute bacterial rhinosinusitis (ABRS) is an infection of your nasal cavity and sinuses. Its caused by bacteria. Acute means that youve had symptoms for less than 12 weeks.  Understanding your sinuses  The nasal cavity is the large air-filled space behind your nose. The sinuses are a group of spaces formed by the bones of your face. They connect with your nasal cavity. ABRS causes the tissue lining these spaces to become inflamed. Mucus may not drain normally. This leads to facial pain and other symptoms.  What causes ABRS?  ABRS most often follows an upper respiratory infection caused by a virus. Bacteria then infect the lining of your nasal cavity and sinuses. But you can also get ABRS if you have:  · Nasal allergies  · Long-term nasal swelling and congestion not caused by allergies  · Blockage in the nose  Symptoms of ABRS  The symptoms of ABRS may be different for each person, and can include:  · Nasal congestion  · Runny nose  · Fluid draining from the nose down the throat (postnasal drip)  · Headache  · Cough  · Pain in the sinuses  · Thick, colored fluid from the nose (mucus)  · Fever  Diagnosing ABRS  ABRS may be diagnosed if youve had an upper respiratory infection like a cold and cough for longer than 10 to 14 days. Your health care provider will ask about your symptoms and your medical history. The provider will check your vital signs, including your temperature. Youll have a physical exam. The health care provider will check your ears, nose, and throat. You likely wont need any tests. If ABRS comes back, you may have a culture or other tests.  Treatment for ABRS  Treatment may include:  · Antibiotic medicine. This is for symptoms that last for at least 10 to 14 days.  · Nasal  corticosteroid medicine. Drops or spray used in the nose can lessen swelling and congestion.  · Over-the-counter pain medicine. This is to lessen sinus pain and pressure.  · Nasal decongestant medicine. Spray or drops may help to lessen congestion. Do not use them for more than a few days.  · Salt wash (saline irrigation). This can help to loosen mucus.  Possible complications of ABRS  ABRS may come back or become long-term (chronic).  In rare cases, ABRS may cause complications such as:   · Inflamed tissue around the brain and spinal cord (meningitis)  · Inflamed tissue around the eyes (orbital cellulitis)  · Inflamed bones around the sinuses (osteitis)  These problems may need to be treated in a hospital with intravenous (IV) antibiotic medicine or surgery.  When to call the health care provider  Call your health care provider if you have any of the following:  · Symptoms that dont get better, or get worse  · Symptoms that dont get better after 3 to 5 days on antibiotics  · Trouble seeing  · Swelling around your eyes  · Confusion or trouble staying awake   Date Last Reviewed: 3/3/2015  © 5257-3040 [employer]. [address] [place] [address]. All rights reserved. This information is not intended as a substitute for professional medical care. Always follow your healthcare professional's instructions.      Pharyngitis: Strep (Presumed)    You have pharyngitis (sore throat). The cause is thought to be the streptococcus, or strep, bacterium. Strep throat infection can cause throat pain that is worse when swallowing, aching all over, headache, and fever. The infection may be spread by coughing, kissing, or touching others after touching your mouth or nose. Antibiotic medications are given to treat the infection.  Home care  · Rest at home. Drink plenty of fluids to avoid dehydration.  · No work or school for the first 2 days of taking the antibiotics. After this time, you will not be  contagious. You can then return to work or school if you are feeling better.   · The antibiotic medication must be taken for the full 10 days, even if you feel better. This is very important to ensure the infection is treated. It is also important to prevent drug-resistant organisms from developing. If you were given an antibiotic shot, no more antibiotics are needed.  · You may use acetaminophen or ibuprofen to control pain or fever, unless another medicine was prescribed for this. If you have chronic liver or kidney disease or ever had a stomach ulcer or GI bleeding, talk with your doctor before using these medicines.  · Throat lozenges or a throat-numbing sprays can help reduce throat pain. Gargling with warm salt water can also help. Dissolve 1/2 teaspoon of salt in 1 8 ounce glass of warm water.   · Avoid salty or spicy foods, which can irritate the throat.  Follow-up care  Follow up with your healthcare provider or our staff if you are not improving over the next week.  When to seek medical advice  Call your healthcare provider right away if any of these occur:  · Fever as directed by your doctor.   · New or worsening ear pain, sinus pain, or headache  · Painful lumps in the back of neck  · Stiff neck  · Lymph nodes are getting larger  · Inability to swallow liquids, excessive drooling, or inability to open mouth wide due to throat pain  · Signs of dehydration (very dark urine or no urine, sunken eyes, dizziness)  · Trouble breathing or noisy breathing  · Muffled voice  · New rash  Date Last Reviewed: 4/13/2015  © 6621-3504 [employer]. [address] [place] [address], [place] [address]. All rights reserved. This information is not intended as a substitute for professional medical care. Always follow your healthcare professional's instructions.

## 2018-11-13 ENCOUNTER — PATIENT MESSAGE (OUTPATIENT)
Dept: NEUROLOGY | Facility: CLINIC | Age: 33
End: 2018-11-13

## 2018-11-14 ENCOUNTER — TELEPHONE (OUTPATIENT)
Dept: PHARMACY | Facility: CLINIC | Age: 33
End: 2018-11-14

## 2018-11-14 ENCOUNTER — PATIENT MESSAGE (OUTPATIENT)
Dept: NEUROLOGY | Facility: CLINIC | Age: 33
End: 2018-11-14

## 2018-11-14 RX ORDER — TOPIRAMATE 50 MG/1
TABLET, FILM COATED ORAL
Qty: 60 TABLET | Refills: 0 | Status: SHIPPED | OUTPATIENT
Start: 2018-11-14 | End: 2018-12-11

## 2018-12-10 NOTE — PROGRESS NOTES
Established Patient   SUBJECTIVE:  Patient ID: La Nena Wright   Chief Complaint: Follow-up    History of Present Illness:  La Nena Wright is a 33 y.o. female who presents to clinic alone for follow-up of headaches.     Recommendations made at last Office Visit on 9/18/2018:  - Start Aimovig 140 mg SQ monthly injectable   - Continue Trokendi 200 mg nightly   - Restart Zanaflex 2-4 mg nightly   - Has relpax available to take as needed for migraine abortive   - Okay to continue taking Vimpat 75 mg as needed to help with sleep   - Continue tracking headaches   - Discussed goals of therapy are to decrease the frequency, intensity, and duration of headaches  - recommend she avoid use of Norco for acute pain, if absolutely needed, she is to avoid use of norco on more than 2-3 days per week to avoid rebound or MOH   - Follow up in 3 months or sooner if needed     12/11/2018 - Interval History:  Patient arrives to clinic 31 minutes late for appointment.  States headaches and migraines have been under great control, she has been tapering off topiramate and is currently taking topiramate 25 mg nightly, tonight is the last day on topiramate 25 mg.  She has continued using Aimovig 140 mg SQ monthly injections, which she attributes her migraine control to.  She denies presence of side effects with medication.  She would like to begin trying to get pregnant in early next year, she is currently still on oral contraception and plans to continue on OCP for at least 6 weeks after discontinuing topiramate.  She is very happy with the current state of her migraines and does not feel adjustments need to be made at this time.     09/18/2018 - Interval History:  Headaches have continued to occur daily with full blown migraines occurring at least 2-3 days per week.  Since last visit, she has stopped taking cymbalta and gabapentin.  She has continued taking trokendi nightly with vimpat prn nightly.  She considered getting Botox  injections for chronic migraine, however has determined she does not want to go through the the Botox injections and would like to discuss alternative treatment options.  She denies any change in the quality or nature of her headaches.       History of Present Illness:   32 y.o. female with chronic migraines, anxiety, IBS, obesity, and fibromyalgia, who presents to clinic alone for evaluation of headaches.  Headaches and migraines initially began when she was 11 years old and have persisted since.  Mother has migraines as well.  States she has had a continuous migraine for the last week.  States she is seeing a neurologist at home, had an occipital nerve block done on August 3rd, was scheduled for facet blocks yesterday, however approval did not go through and injections were subsequently cancelled.  She is here for a second opinion?  Migraines typically are one-sided or the other, pain is described as a pounding or throbbing that is severe in nature.  Migraines occur at least 15 days per month each lasting all day if not multiple days.  Associated symptoms include nausea, vomiting, photo/phonophobia.  Migraines are worsened by movement. She has tried numerous preventive options without effect on her migraines.  Since she had the occipital nerve block, she feels the nerve block triggered this migraine she now has muscle atrophy in her right arm as well as burning in her arms.  Reports she has had MRI C-Spine as well as two EMG's performed by outside radiology.  Additionally, she is getting  in November, currently not on birth control, though she and her fiance are using condoms and not actively planning to get pregnant.       Treatments Tried and Response  Amitriptyline - caused AM drowsiness   Excedrin -   Trokendi - no   Nerve blocks - no   Relpax - helped in past  Sumatriptan PO - no   Sumatriptan SQ - no   Onzetra - no   Maxalt - no   Gabapentin - no   cymbalta - unsure   Prednisone - given  "today   Topiramate - no   Propranolol - no   Zanaflex - retried today   Aimovig - helping     Current Medications:    dexchlorpheniramin-pseudoephed (RESCON) 2-60 mg Tab, Take 1 tablet by mouth 2 (two) times daily as needed., Disp: 20 tablet, Rfl: 0    eletriptan (RELPAX) 40 MG tablet, Take 1 tablet (40 mg total) by mouth as needed. may repeat in 2 hours if necessary.  Max 2 tabs/day. Max 3 days/week., Disp: 12 tablet, Rfl: 5    erenumab-aooe (AIMOVIG AUTOINJECTOR) 70 mg/mL AtIn, Inject 2 mLs (140 mg total) into the skin every 28 days., Disp: 2 Syringe, Rfl: 11    escitalopram oxalate (LEXAPRO) 10 MG tablet, Take 10 mg by mouth once daily., Disp: , Rfl:     HYDROcodone-acetaminophen (NORCO) 5-325 mg per tablet, Take 1 tablet by mouth every 6 (six) hours as needed for Pain (1/2 tablet as needed)., Disp: , Rfl:     multivitamin (ONE DAILY MULTIVITAMIN) per tablet, Take 1 tablet by mouth once daily., Disp: , Rfl:     norethindrone-ethinyl estradiol-iron (MICROGESTIN FE1.5/30) 1.5 mg-30 mcg (21)/75 mg (7) tablet, Take 1 tablet by mouth once daily., Disp: , Rfl:     promethazine (PHENERGAN) 25 MG tablet, Take 1 tablet (25 mg total) by mouth every 6 (six) hours as needed for Nausea., Disp: 20 tablet, Rfl: 0    tiZANidine (ZANAFLEX) 2 MG tablet, Take 1-2 tabs around bedtime, no alcohol, no driving with medication., Disp: 60 tablet, Rfl: 5    vitamin D 1000 units Tab, Take 1,000 Units by mouth once daily., Disp: , Rfl:     Review of Systems - as per HPI, otherwise a balanced 10 systems review is negative.    OBJECTIVE:  Vitals:  /86 (BP Location: Right arm, Patient Position: Sitting, BP Method: Large (Automatic))   Pulse 92   Ht 5' 3" (1.6 m)   Wt 82.1 kg (180 lb 16 oz)   BMI 32.06 kg/m²      Physical Exam:  Constitutional: she appears well-developed and well-nourished. she is well groomed. NAD   HENT:    Head: Normocephalic and atraumatic  Eyes: Conjunctivae and EOM are normal  Musculoskeletal: Normal " range of motion. No joint stiffness.   Skin: Skin is warm and dry.  Psychiatric: Mood and affect are normal    Neuro: Patient is alert and oriented to person, place, and time. Language is intact and fluent.  Recent and remote memory are intact.  Normal attention and concentration.  Facial movement is symmetric.  Gait is normal.     ASSESSMENT:  1. Migraine without aura and without status migrainosus, not intractable    2. Obesity (BMI 30.0-34.9)      PLAN:  - Continue Aimovig 140 mg SQ monthly injections  - Discussed there is no information regarding Aimovig and pregnancy, would recommend being off Aimovig for at least 4-6 months prior to achieving pregnancy, which she understands   - Continue with taper off topiramate  - Continue on OCP for at least 6 weeks, if her plans should change and she decides to try for pregnancy sooner than next Summer, she is to send message via patient portal with update   - Continue tracking headaches   - Discussed goals of therapy are to decrease the frequency, intensity, and duration of headaches  - Obesity - encouraged her to begin exercising regularly and following a healthy diet   - RTC in 3 months or sooner if needed      Questions and concerns were sought and answered to the patient's stated verbal satisfaction.  The patient verbalizes understanding and agreement with the above stated treatment plan.     CC: MD Sari Kang, FNP-C  [facility]   [phone]    Dr. Cotto was available during today's encounter.

## 2018-12-11 ENCOUNTER — OFFICE VISIT (OUTPATIENT)
Dept: NEUROLOGY | Facility: CLINIC | Age: 33
End: 2018-12-11
Payer: COMMERCIAL

## 2018-12-11 VITALS
SYSTOLIC BLOOD PRESSURE: 130 MMHG | WEIGHT: 181 LBS | BODY MASS INDEX: 32.07 KG/M2 | HEIGHT: 63 IN | DIASTOLIC BLOOD PRESSURE: 86 MMHG | HEART RATE: 92 BPM

## 2018-12-11 DIAGNOSIS — E66.9 OBESITY (BMI 30.0-34.9): ICD-10-CM

## 2018-12-11 DIAGNOSIS — G43.009 MIGRAINE WITHOUT AURA AND WITHOUT STATUS MIGRAINOSUS, NOT INTRACTABLE: Primary | ICD-10-CM

## 2018-12-11 PROCEDURE — 99213 OFFICE O/P EST LOW 20 MIN: CPT | Mod: S$GLB,,, | Performed by: NURSE PRACTITIONER

## 2018-12-11 PROCEDURE — 99999 PR PBB SHADOW E&M-EST. PATIENT-LVL III: CPT | Mod: PBBFAC,,, | Performed by: NURSE PRACTITIONER

## 2018-12-11 PROCEDURE — 3008F BODY MASS INDEX DOCD: CPT | Mod: CPTII,S$GLB,, | Performed by: NURSE PRACTITIONER

## 2018-12-11 RX ORDER — ESCITALOPRAM OXALATE 10 MG/1
10 TABLET ORAL DAILY
COMMUNITY
End: 2019-02-18

## 2018-12-12 ENCOUNTER — TELEPHONE (OUTPATIENT)
Dept: PHARMACY | Facility: CLINIC | Age: 33
End: 2018-12-12

## 2018-12-13 PROBLEM — G43.009 MIGRAINE WITHOUT AURA AND WITHOUT STATUS MIGRAINOSUS, NOT INTRACTABLE: Status: ACTIVE | Noted: 2018-08-27

## 2019-01-23 ENCOUNTER — TELEPHONE (OUTPATIENT)
Dept: PHARMACY | Facility: CLINIC | Age: 34
End: 2019-01-23

## 2019-01-23 NOTE — TELEPHONE ENCOUNTER
During a routine refill call the patient stated that she would like to stop Aimovig at this time. She plans to discuss this further with her provider at her appointment in February.       Provider notified via InBasket.     Moise Elizondo, PharmD  Clinical Pharmacist  [facility]  P: [phone]

## 2019-02-13 NOTE — PROGRESS NOTES
"Established Patient   SUBJECTIVE:  Patient ID: La Nena Wright   Chief Complaint: Follow-up    History of Present Illness:  La Nena Wright is a 33 y.o. female who presents to clinic with her  for follow-up of headaches.     Recommendations made at last Office Visit on 12/11/2018:  - Continue Aimovig 140 mg SQ monthly injections  - Discussed there is no information regarding Aimovig and pregnancy, would recommend being off Aimovig for at least 4-6 months prior to achieving pregnancy, which she understands   - Continue with taper off topiramate  - Continue on OCP for at least 6 weeks, if her plans should change and she decides to try for pregnancy sooner than next Summer, she is to send message via patient portal with update   - Continue tracking headaches   - Discussed goals of therapy are to decrease the frequency, intensity, and duration of headaches  - Obesity - encouraged her to begin exercising regularly and following a healthy diet   - RTC in 3 months or sooner if needed      02/18/2019 - Interval History:  Migraines have been under good control, last migraine occurred 3 months ago.  She does admit she gets "little headaches" which she treats with motrin.  She is no longer taking topiramate and hasn't in nearly 3 months, she has stopped Aimovig in anticipation of attempting to get pregnant in early Summer 2019.  She is very happy with the current state of her migraines and does not feel adjustments need to be made at this time.  She has relpax available if she develops a migraine.  She has secondary complaints that her fibromyalgia has been flaring up, she has tried gabapentin and lyrica in the past, however side effects were more profound than the response she experienced.  She is not established with Rheumatology.      12/11/2018 - Interval History:  Patient arrives to clinic 31 minutes late for appointment.  States headaches and migraines have been under great control, she has been tapering off " topiramate and is currently taking topiramate 25 mg nightly, tonight is the last day on topiramate 25 mg.  She has continued using Aimovig 140 mg SQ monthly injections, which she attributes her migraine control to.  She denies presence of side effects with medication.  She would like to begin trying to get pregnant in early next year, she is currently still on oral contraception and plans to continue on OCP for at least 6 weeks after discontinuing topiramate.  She is very happy with the current state of her migraines and does not feel adjustments need to be made at this time.      09/18/2018 - Interval History:  Headaches have continued to occur daily with full blown migraines occurring at least 2-3 days per week.  Since last visit, she has stopped taking cymbalta and gabapentin.  She has continued taking trokendi nightly with vimpat prn nightly.  She considered getting Botox injections for chronic migraine, however has determined she does not want to go through the the Botox injections and would like to discuss alternative treatment options.  She denies any change in the quality or nature of her headaches.       History of Present Illness:   32 y.o. female with chronic migraines, anxiety, IBS, obesity, and fibromyalgia, who presents to clinic alone for evaluation of headaches.  Headaches and migraines initially began when she was 11 years old and have persisted since.  Mother has migraines as well.  States she has had a continuous migraine for the last week.  States she is seeing a neurologist at home, had an occipital nerve block done on August 3rd, was scheduled for facet blocks yesterday, however approval did not go through and injections were subsequently cancelled.  She is here for a second opinion?  Migraines typically are one-sided or the other, pain is described as a pounding or throbbing that is severe in nature.  Migraines occur at least 15 days per month each lasting all day if not multiple days.   Associated symptoms include nausea, vomiting, photo/phonophobia.  Migraines are worsened by movement. She has tried numerous preventive options without effect on her migraines.  Since she had the occipital nerve block, she feels the nerve block triggered this migraine she now has muscle atrophy in her right arm as well as burning in her arms.  Reports she has had MRI C-Spine as well as two EMG's performed by outside radiology.  Additionally, she is getting  in November, currently not on birth control, though she and her fiance are using condoms and not actively planning to get pregnant.       Treatments Tried and Response  Amitriptyline - caused AM drowsiness   Excedrin -   Trokendi - no   Nerve blocks - no   Relpax - helped in past  Sumatriptan PO - no   Sumatriptan SQ - no   Onzetra - no   Maxalt - no   Gabapentin - no   cymbalta - unsure   Prednisone - given today   Topiramate - no   Propranolol - no   Zanaflex - retried today   Aimovig - helping     Current Medications:    DICLOFENAC SODIUM ORAL, Take 75 mg by mouth once daily., Disp: , Rfl:     dicyclomine (BENTYL) 20 mg tablet, Take 20 mg by mouth 2 (two) times daily., Disp: , Rfl:     eletriptan (RELPAX) 40 MG tablet, Take 1 tablet (40 mg total) by mouth as needed. may repeat in 2 hours if necessary.  Max 2 tabs/day. Max 3 days/week., Disp: 12 tablet, Rfl: 5    HYDROcodone-acetaminophen (NORCO) 5-325 mg per tablet, Take 1 tablet by mouth every 6 (six) hours as needed for Pain (1/2 tablet as needed)., Disp: , Rfl:     multivitamin (ONE DAILY MULTIVITAMIN) per tablet, Take 1 tablet by mouth once daily., Disp: , Rfl:     norethindrone-ethinyl estradiol-iron (MICROGESTIN FE1.5/30) 1.5 mg-30 mcg (21)/75 mg (7) tablet, Take 1 tablet by mouth once daily., Disp: , Rfl:     tiZANidine (ZANAFLEX) 2 MG tablet, Take 1-2 tabs around bedtime, no alcohol, no driving with medication., Disp: 60 tablet, Rfl: 5    vitamin D 1000 units Tab, Take 1,000 Units by  "mouth once daily., Disp: , Rfl:     Review of Systems - as per HPI, otherwise a balanced 10 systems review is negative.    OBJECTIVE:  Vitals:  /81 (BP Location: Right arm, Patient Position: Sitting, BP Method: X-Large (Automatic))   Pulse 80   Ht 5' 3" (1.6 m)   Wt 86.4 kg (190 lb 7.6 oz)   BMI 33.74 kg/m²      Physical Exam:  Constitutional: she appears well-developed and well-nourished. she is well groomed. NAD   HENT:    Head: Normocephalic and atraumatic  Eyes: Conjunctivae and EOM are normal  Musculoskeletal: Normal range of motion. No joint stiffness.   Skin: Skin is warm and dry.  Psychiatric: Mood and affect are normal    Neuro: Patient is alert and oriented to person, place, and time. Language is intact and fluent.  Recent and remote memory are intact.  Normal attention and concentration.  Facial movement is symmetric.  Gait is normal.     ASSESSMENT:  1. Migraine without aura and without status migrainosus, not intractable    2. Fibromyalgia    3. Obesity (BMI 30.0-34.9)      PLAN:  - Migraines currently well controlled off prevention  - Has relpax available as needed for acute migraines  - Continue tracking headaches   - Fibromyalgia - encouraged her to establish care with Rheumatology if needed, offered referral which she deferred  - Obesity - encouraged her to continue exercising regularly and following a healthy diet   - RTC in 6-12 months      Questions and concerns were sought and answered to the patient's stated verbal satisfaction.  The patient verbalizes understanding and agreement with the above stated treatment plan.     CC: MD Sari Mcfadden Jr, FNP-C  [facility]   [phone]    Dr. Cotto was available during today's encounter.     "

## 2019-02-18 ENCOUNTER — OFFICE VISIT (OUTPATIENT)
Dept: NEUROLOGY | Facility: CLINIC | Age: 34
End: 2019-02-18
Payer: COMMERCIAL

## 2019-02-18 VITALS
DIASTOLIC BLOOD PRESSURE: 81 MMHG | WEIGHT: 190.5 LBS | HEIGHT: 63 IN | BODY MASS INDEX: 33.75 KG/M2 | SYSTOLIC BLOOD PRESSURE: 126 MMHG | HEART RATE: 80 BPM

## 2019-02-18 DIAGNOSIS — G43.009 MIGRAINE WITHOUT AURA AND WITHOUT STATUS MIGRAINOSUS, NOT INTRACTABLE: Primary | ICD-10-CM

## 2019-02-18 DIAGNOSIS — E66.9 OBESITY (BMI 30.0-34.9): ICD-10-CM

## 2019-02-18 DIAGNOSIS — M79.7 FIBROMYALGIA: ICD-10-CM

## 2019-02-18 PROCEDURE — 99999 PR PBB SHADOW E&M-EST. PATIENT-LVL III: ICD-10-PCS | Mod: PBBFAC,,, | Performed by: NURSE PRACTITIONER

## 2019-02-18 PROCEDURE — 99999 PR PBB SHADOW E&M-EST. PATIENT-LVL III: CPT | Mod: PBBFAC,,, | Performed by: NURSE PRACTITIONER

## 2019-02-18 PROCEDURE — 3008F PR BODY MASS INDEX (BMI) DOCUMENTED: ICD-10-PCS | Mod: CPTII,S$GLB,, | Performed by: NURSE PRACTITIONER

## 2019-02-18 PROCEDURE — 99213 PR OFFICE/OUTPT VISIT, EST, LEVL III, 20-29 MIN: ICD-10-PCS | Mod: S$GLB,,, | Performed by: NURSE PRACTITIONER

## 2019-02-18 PROCEDURE — 99213 OFFICE O/P EST LOW 20 MIN: CPT | Mod: S$GLB,,, | Performed by: NURSE PRACTITIONER

## 2019-02-18 PROCEDURE — 3008F BODY MASS INDEX DOCD: CPT | Mod: CPTII,S$GLB,, | Performed by: NURSE PRACTITIONER

## 2019-02-18 RX ORDER — DICYCLOMINE HYDROCHLORIDE 20 MG/1
20 TABLET ORAL 2 TIMES DAILY
COMMUNITY
End: 2020-12-03

## 2020-01-14 PROBLEM — R11.10 HYPEREMESIS: Status: ACTIVE | Noted: 2020-01-14

## 2020-05-20 DIAGNOSIS — Z01.84 ANTIBODY RESPONSE EXAMINATION: ICD-10-CM

## 2020-07-19 PROBLEM — Z3A.40 40 WEEKS GESTATION OF PREGNANCY: Status: ACTIVE | Noted: 2020-07-19

## 2020-12-03 PROBLEM — M54.9 ACUTE MIDLINE BACK PAIN: Status: RESOLVED | Noted: 2018-03-14 | Resolved: 2020-12-03

## 2020-12-03 PROBLEM — Z3A.40 40 WEEKS GESTATION OF PREGNANCY: Status: RESOLVED | Noted: 2020-07-19 | Resolved: 2020-12-03

## 2021-04-22 PROBLEM — R11.10 HYPEREMESIS: Status: RESOLVED | Noted: 2020-01-14 | Resolved: 2021-04-22

## 2021-05-06 ENCOUNTER — PATIENT MESSAGE (OUTPATIENT)
Dept: RESEARCH | Facility: HOSPITAL | Age: 36
End: 2021-05-06

## 2022-01-09 PROBLEM — O26.899 ABDOMINAL PAIN AFFECTING PREGNANCY: Status: ACTIVE | Noted: 2022-01-09

## 2022-01-09 PROBLEM — R10.9 ABDOMINAL PAIN AFFECTING PREGNANCY: Status: ACTIVE | Noted: 2022-01-09

## 2022-01-09 PROBLEM — N93.9 VAGINAL SPOTTING: Status: ACTIVE | Noted: 2022-01-09

## 2022-01-11 PROBLEM — O21.0 HYPEREMESIS AFFECTING PREGNANCY, ANTEPARTUM: Status: ACTIVE | Noted: 2020-01-14

## 2022-08-16 PROBLEM — Z3A.39 39 WEEKS GESTATION OF PREGNANCY: Status: ACTIVE | Noted: 2022-08-16

## 2023-07-22 RX ORDER — BUSPIRONE HYDROCHLORIDE 30 MG/1
30 TABLET ORAL 2 TIMES DAILY
Qty: 60 TABLET | Refills: 11 | Status: SHIPPED | OUTPATIENT
Start: 2023-07-22 | End: 2024-03-12

## 2023-07-24 PROBLEM — Z3A.39 39 WEEKS GESTATION OF PREGNANCY: Status: RESOLVED | Noted: 2022-08-16 | Resolved: 2023-07-24

## 2023-10-15 DIAGNOSIS — G47.30 SLEEP APNEA, UNSPECIFIED: Primary | ICD-10-CM

## 2023-10-20 ENCOUNTER — HOSPITAL ENCOUNTER (OUTPATIENT)
Dept: SLEEP MEDICINE | Facility: HOSPITAL | Age: 38
Discharge: HOME | End: 2023-10-20
Attending: NURSE PRACTITIONER
Payer: COMMERCIAL

## 2023-10-20 DIAGNOSIS — G47.30 SLEEP APNEA, UNSPECIFIED: ICD-10-CM

## 2023-10-20 PROCEDURE — 95806 SLEEP STUDY UNATT&RESP EFFT: CPT

## 2023-10-23 PROBLEM — G47.30 SLEEP APNEA, UNSPECIFIED: Status: ACTIVE | Noted: 2023-10-23

## 2023-11-28 PROBLEM — O21.0 HYPEREMESIS AFFECTING PREGNANCY, ANTEPARTUM: Status: RESOLVED | Noted: 2020-01-14 | Resolved: 2023-11-28

## 2023-11-28 PROBLEM — N93.9 VAGINAL SPOTTING: Status: RESOLVED | Noted: 2022-01-09 | Resolved: 2023-11-28

## 2023-11-28 PROBLEM — R10.9 ABDOMINAL PAIN: Status: RESOLVED | Noted: 2022-01-09 | Resolved: 2023-11-28

## 2024-03-28 PROBLEM — R10.2 PELVIC PAIN: Status: ACTIVE | Noted: 2024-03-28

## 2024-07-16 ENCOUNTER — OCCUPATIONAL HEALTH (OUTPATIENT)
Dept: URGENT CARE | Facility: CLINIC | Age: 39
End: 2024-07-16

## 2024-07-16 DIAGNOSIS — Z02.83 ENCOUNTER FOR DRUG SCREENING: Primary | ICD-10-CM

## 2024-07-16 LAB
CTP QC/QA: YES
POC 10 PANEL DRUG SCREEN: NEGATIVE

## 2025-03-12 PROBLEM — R19.7 DIARRHEA: Status: ACTIVE | Noted: 2025-03-12

## 2025-06-07 RX ORDER — ONDANSETRON 8 MG/1
8 TABLET, ORALLY DISINTEGRATING ORAL EVERY 8 HOURS PRN
Qty: 90 TABLET | Refills: 0 | Status: SHIPPED | OUTPATIENT
Start: 2025-06-07

## 2025-06-07 RX ORDER — BUTALBITAL, ACETAMINOPHEN AND CAFFEINE 50; 325; 40 MG/1; MG/1; MG/1
1 TABLET ORAL EVERY 6 HOURS PRN
Qty: 10 TABLET | Refills: 0 | Status: SHIPPED | OUTPATIENT
Start: 2025-06-07